# Patient Record
Sex: MALE | Race: ASIAN | URBAN - METROPOLITAN AREA
[De-identification: names, ages, dates, MRNs, and addresses within clinical notes are randomized per-mention and may not be internally consistent; named-entity substitution may affect disease eponyms.]

---

## 2021-11-06 ENCOUNTER — EMERGENCY ROOM – HOSPITAL (OUTPATIENT)
Dept: URBAN - METROPOLITAN AREA HOSPITAL 96 | Facility: HOSPITAL | Age: 61
End: 2021-11-06
Payer: COMMERCIAL

## 2021-11-06 DIAGNOSIS — K92.2 GASTROINTESTINAL HEMORRHAGE, UNSPECIFIED: ICD-10-CM

## 2021-11-06 DIAGNOSIS — Z79.1 LONG TERM (CURRENT) USE OF NON-STEROIDAL ANTI-INFLAMMATORIES: ICD-10-CM

## 2021-11-06 PROCEDURE — 99222 1ST HOSP IP/OBS MODERATE 55: CPT | Performed by: INTERNAL MEDICINE

## 2021-11-07 ENCOUNTER — INPATIENT HOSPITAL (OUTPATIENT)
Dept: URBAN - METROPOLITAN AREA HOSPITAL 95 | Facility: HOSPITAL | Age: 61
End: 2021-11-07
Payer: COMMERCIAL

## 2021-11-07 DIAGNOSIS — K92.2 GASTROINTESTINAL HEMORRHAGE, UNSPECIFIED: ICD-10-CM

## 2021-11-07 DIAGNOSIS — Z79.1 LONG TERM (CURRENT) USE OF NON-STEROIDAL ANTI-INFLAMMATORIES: ICD-10-CM

## 2021-11-07 PROCEDURE — 99232 SBSQ HOSP IP/OBS MODERATE 35: CPT | Performed by: INTERNAL MEDICINE

## 2021-11-08 ENCOUNTER — INPATIENT HOSPITAL (OUTPATIENT)
Dept: URBAN - METROPOLITAN AREA HOSPITAL 95 | Facility: HOSPITAL | Age: 61
End: 2021-11-08

## 2021-11-08 DIAGNOSIS — Z79.1 LONG TERM (CURRENT) USE OF NON-STEROIDAL ANTI-INFLAMMATORIES: ICD-10-CM

## 2021-11-08 DIAGNOSIS — K92.2 GASTROINTESTINAL HEMORRHAGE, UNSPECIFIED: ICD-10-CM

## 2021-11-08 PROCEDURE — 43248 EGD GUIDE WIRE INSERTION: CPT | Performed by: INTERNAL MEDICINE

## 2021-11-30 ENCOUNTER — OFFICE (OUTPATIENT)
Dept: URBAN - METROPOLITAN AREA CLINIC 9 | Facility: CLINIC | Age: 61
End: 2021-11-30
Payer: COMMERCIAL

## 2021-11-30 VITALS
DIASTOLIC BLOOD PRESSURE: 103 MMHG | SYSTOLIC BLOOD PRESSURE: 176 MMHG | HEART RATE: 89 BPM | HEIGHT: 67 IN | SYSTOLIC BLOOD PRESSURE: 174 MMHG | WEIGHT: 162 LBS | DIASTOLIC BLOOD PRESSURE: 101 MMHG | OXYGEN SATURATION: 98 %

## 2021-11-30 DIAGNOSIS — K92.89 OTHER SPECIFIED DISEASES OF THE DIGESTIVE SYSTEM: ICD-10-CM

## 2021-11-30 DIAGNOSIS — K26.9 DUODENAL ULCER, UNSPECIFIED AS ACUTE OR CHRONIC, WITHOUT HEM: ICD-10-CM

## 2021-11-30 DIAGNOSIS — D64.9 ANEMIA, UNSPECIFIED: ICD-10-CM

## 2021-11-30 LAB
CBC, PLATELET, NO DIFFERENTIAL: HEMATOCRIT: 40.3 % (ref 37.5–51)
CBC, PLATELET, NO DIFFERENTIAL: HEMOGLOBIN: 12.5 G/DL — LOW (ref 13–17.7)
CBC, PLATELET, NO DIFFERENTIAL: MCH: 29.6 PG (ref 26.6–33)
CBC, PLATELET, NO DIFFERENTIAL: MCHC: 31 G/DL — LOW (ref 31.5–35.7)
CBC, PLATELET, NO DIFFERENTIAL: MCV: 95 FL (ref 79–97)
CBC, PLATELET, NO DIFFERENTIAL: PLATELETS: 188 X10E3/UL (ref 150–450)
CBC, PLATELET, NO DIFFERENTIAL: RBC: 4.23 X10E6/UL (ref 4.14–5.8)
CBC, PLATELET, NO DIFFERENTIAL: RDW: 14.1 % (ref 11.6–15.4)
CBC, PLATELET, NO DIFFERENTIAL: WBC: 4.4 X10E3/UL (ref 3.4–10.8)
COMP. METABOLIC PANEL (14): A/G RATIO: 2 (ref 1.2–2.2)
COMP. METABOLIC PANEL (14): ALBUMIN: 4.7 G/DL (ref 3.8–4.9)
COMP. METABOLIC PANEL (14): ALKALINE PHOSPHATASE: 94 IU/L (ref 44–121)
COMP. METABOLIC PANEL (14): ALT (SGPT): 19 IU/L (ref 0–44)
COMP. METABOLIC PANEL (14): AST (SGOT): 19 IU/L (ref 0–40)
COMP. METABOLIC PANEL (14): BILIRUBIN, TOTAL: 0.3 MG/DL (ref 0–1.2)
COMP. METABOLIC PANEL (14): BUN/CREATININE RATIO: 15 (ref 9–20)
COMP. METABOLIC PANEL (14): BUN: 13 MG/DL (ref 6–24)
COMP. METABOLIC PANEL (14): CALCIUM: 9.4 MG/DL (ref 8.7–10.2)
COMP. METABOLIC PANEL (14): CARBON DIOXIDE, TOTAL: 22 MMOL/L (ref 20–29)
COMP. METABOLIC PANEL (14): CHLORIDE: 104 MMOL/L (ref 96–106)
COMP. METABOLIC PANEL (14): CREATININE: 0.86 MG/DL (ref 0.76–1.27)
COMP. METABOLIC PANEL (14): EGFR IF AFRICN AM: 110 ML/MIN/1.73 (ref 59–?)
COMP. METABOLIC PANEL (14): EGFR IF NONAFRICN AM: 95 ML/MIN/1.73 (ref 59–?)
COMP. METABOLIC PANEL (14): GLOBULIN, TOTAL: 2.4 G/DL (ref 1.5–4.5)
COMP. METABOLIC PANEL (14): GLUCOSE: 114 MG/DL — HIGH (ref 65–99)
COMP. METABOLIC PANEL (14): POTASSIUM: 3.8 MMOL/L (ref 3.5–5.2)
COMP. METABOLIC PANEL (14): PROTEIN, TOTAL: 7.1 G/DL (ref 6–8.5)
COMP. METABOLIC PANEL (14): SODIUM: 142 MMOL/L (ref 134–144)
FE+TIBC+FER: FERRITIN: 86 NG/ML (ref 30–400)
FE+TIBC+FER: IRON BIND.CAP.(TIBC): 381 UG/DL (ref 250–450)
FE+TIBC+FER: IRON SATURATION: 10 % — LOW (ref 15–55)
FE+TIBC+FER: IRON: 37 UG/DL — LOW (ref 38–169)
FE+TIBC+FER: UIBC: 344 UG/DL — HIGH (ref 111–343)
VITAMIN B12 AND FOLATE: FOLATE (FOLIC ACID), SERUM: 12.6 NG/ML (ref 3–?)
VITAMIN B12 AND FOLATE: VITAMIN B12: 349 PG/ML (ref 232–1245)

## 2021-11-30 PROCEDURE — 99213 OFFICE O/P EST LOW 20 MIN: CPT | Performed by: NURSE PRACTITIONER

## 2021-11-30 RX ORDER — PANTOPRAZOLE SODIUM 40 MG/1
TABLET, DELAYED RELEASE ORAL
Qty: 60 | Refills: 0 | Status: ACTIVE

## 2021-11-30 NOTE — SERVICEHPINOTES
Mr. Gonzalez is a pleasant 61-year-old  male who presents to establish care and post hospital admission for acute upper GI bleed. Past medical history of questionable hypertension. No previous daily home meds. Patient states in late August/September 2021 he started having some left axillary pain and had seen a dentist and was diagnosed with per abdominal infection and was prescribed antibiotics and NSAIDs. He states he was taking NSAIDs 4 times per day for greater than 1 month. He noted that he started having decreased p.o. intake and a fullness feeling in his abdomen. He eventually started having nausea and coffee ground emesis just prior to his ER visit at [facility] 11/06/2021. Patient also had melena for 24 hours. He was transferred from [facility] to [facility] for upper GI bleed and underwent EGD by Dr. 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 Plan was Carafate 1 g p.o. b.i.d. times 30 days and continue with pantoprazole 40 mg p.o. b.i.d. and likely repeat dilation as outpatient.Patient is denying any further nausea, vomiting, coffee-ground emesis, bright red hematemesis, abdominal pain, melena. Having normal, soft, brown stools. Tolerating p.o. intake just fine. Taking pantoprazole and sucralfate as prescribed. No dysphagia or odynophagia. While in the hospital he was also noted to have a hemoglobin of 6.7 hematocrit of 20 he received 2 units of packed red blood cells. As above, there was no active GI bleeding at time of EGD 11/08/2021. Patient denies any known history of anemia or iron deficiency in the past. Patient has never underwent screening colonoscopy for any reason the past. No known family history of colon cancer stomach cancer. Denies weight loss.

## 2021-11-30 NOTE — SERVICENOTES
EGD in 2 months to check for healing duodenal ulcer and likely repeat dilation duodenal stricture.  Patient is denying any dysphagia or further nausea / vomiting.  Having normal brown stools.  No melena or hematochezia.  He is aware he needs to avoid NSAIDs.  May take Tylenol/acetaminophen p.o. p.r.n..  Continue pantoprazole 40 mg p.o. b.i.d. for now and sucralfate 1 g p.o. b.i.d. for 1 month.  Patient was anemic in the hospital from upper GI bleed.  Will recheck labs in iron, vitamin B12, folate.  Patient also has never underwent screening  colonoscopy in past.   patient wants to wait at this time and think about screening colonoscopy.  He was notified if he decides he does want to proceed with screening colonoscopy that we can add this on to his EGD in 2 months.